# Patient Record
Sex: FEMALE
[De-identification: names, ages, dates, MRNs, and addresses within clinical notes are randomized per-mention and may not be internally consistent; named-entity substitution may affect disease eponyms.]

---

## 2023-12-07 ENCOUNTER — DASHBOARD ENCOUNTERS (OUTPATIENT)
Age: 53
End: 2023-12-07

## 2023-12-07 ENCOUNTER — OFFICE VISIT (OUTPATIENT)
Dept: URBAN - METROPOLITAN AREA CLINIC 118 | Facility: CLINIC | Age: 53
End: 2023-12-07
Payer: COMMERCIAL

## 2023-12-07 VITALS
BODY MASS INDEX: 34.65 KG/M2 | WEIGHT: 220.8 LBS | HEART RATE: 101 BPM | TEMPERATURE: 97.3 F | HEIGHT: 67 IN | SYSTOLIC BLOOD PRESSURE: 130 MMHG | DIASTOLIC BLOOD PRESSURE: 83 MMHG

## 2023-12-07 DIAGNOSIS — R10.13 DYSPEPSIA: ICD-10-CM

## 2023-12-07 DIAGNOSIS — R68.81 EARLY SATIETY: ICD-10-CM

## 2023-12-07 DIAGNOSIS — R14.0 BLOATING: ICD-10-CM

## 2023-12-07 DIAGNOSIS — K76.89 HEPATIC CYST: ICD-10-CM

## 2023-12-07 PROBLEM — 85057007: Status: ACTIVE | Noted: 2023-12-07

## 2023-12-07 PROCEDURE — 99204 OFFICE O/P NEW MOD 45 MIN: CPT | Performed by: INTERNAL MEDICINE

## 2023-12-07 PROCEDURE — 99244 OFF/OP CNSLTJ NEW/EST MOD 40: CPT | Performed by: INTERNAL MEDICINE

## 2023-12-07 RX ORDER — AMLODIPINE BESYLATE 10 MG/1
TABLET ORAL
Qty: 90 TABLET | Status: ACTIVE | COMMUNITY

## 2023-12-07 RX ORDER — FAMOTIDINE 40 MG/1
1 TABLET AT BEDTIME TABLET, FILM COATED ORAL ONCE A DAY
Qty: 30 | Refills: 5 | OUTPATIENT
Start: 2023-12-07

## 2023-12-07 RX ORDER — GABAPENTIN 800 MG/1
TABLET ORAL
Qty: 270 TABLET | Status: ACTIVE | COMMUNITY

## 2023-12-07 RX ORDER — AZITHROMYCIN DIHYDRATE 250 MG/1
TABLET ORAL
Qty: 6 TABLET | Status: DISCONTINUED | COMMUNITY

## 2023-12-07 NOTE — HPI-TODAY'S VISIT:
Patient was referred by Dr. Turner for abnormal CT scan A copy of this document will be sent to the physician.   I logged into epic to review the CT scan results, multiple cysts; also with likely adrenal adenoma  she reports dyspepsia, gets full easily 2 years no weight loss hasn't taken ppi no hx hp that she is aware of

## 2024-01-09 ENCOUNTER — OFFICE VISIT (OUTPATIENT)
Dept: URBAN - METROPOLITAN AREA SURGERY CENTER 23 | Facility: SURGERY CENTER | Age: 54
End: 2024-01-09

## 2024-01-25 ENCOUNTER — OFFICE VISIT (OUTPATIENT)
Dept: URBAN - METROPOLITAN AREA MEDICAL CENTER 16 | Facility: MEDICAL CENTER | Age: 54
End: 2024-01-25

## 2024-03-19 ENCOUNTER — COL/EGD (OUTPATIENT)
Dept: URBAN - METROPOLITAN AREA SURGERY CENTER 23 | Facility: SURGERY CENTER | Age: 54
End: 2024-03-19